# Patient Record
Sex: FEMALE | Race: OTHER | HISPANIC OR LATINO | URBAN - METROPOLITAN AREA
[De-identification: names, ages, dates, MRNs, and addresses within clinical notes are randomized per-mention and may not be internally consistent; named-entity substitution may affect disease eponyms.]

---

## 2017-01-28 ENCOUNTER — OUTPATIENT (OUTPATIENT)
Dept: OUTPATIENT SERVICES | Facility: HOSPITAL | Age: 47
LOS: 1 days | End: 2017-01-28
Payer: COMMERCIAL

## 2017-01-28 DIAGNOSIS — Z00.8 ENCOUNTER FOR OTHER GENERAL EXAMINATION: ICD-10-CM

## 2017-01-28 PROCEDURE — 77067 SCR MAMMO BI INCL CAD: CPT

## 2017-01-28 PROCEDURE — 77063 BREAST TOMOSYNTHESIS BI: CPT | Mod: 26

## 2017-01-28 PROCEDURE — G0202: CPT | Mod: 26

## 2017-01-28 PROCEDURE — 77063 BREAST TOMOSYNTHESIS BI: CPT

## 2017-10-03 PROBLEM — Z00.00 ENCOUNTER FOR PREVENTIVE HEALTH EXAMINATION: Status: ACTIVE | Noted: 2017-10-03

## 2017-11-01 ENCOUNTER — APPOINTMENT (OUTPATIENT)
Dept: DERMATOLOGY | Facility: CLINIC | Age: 47
End: 2017-11-01
Payer: MEDICAID

## 2017-11-01 PROCEDURE — 99201 OFFICE OUTPATIENT NEW 10 MINUTES: CPT

## 2018-01-02 ENCOUNTER — APPOINTMENT (OUTPATIENT)
Dept: DERMATOLOGY | Facility: CLINIC | Age: 48
End: 2018-01-02

## 2018-04-13 ENCOUNTER — APPOINTMENT (OUTPATIENT)
Dept: DERMATOLOGY | Facility: CLINIC | Age: 48
End: 2018-04-13
Payer: MEDICAID

## 2018-04-13 PROCEDURE — 99212 OFFICE O/P EST SF 10 MIN: CPT

## 2018-06-15 ENCOUNTER — APPOINTMENT (OUTPATIENT)
Dept: DERMATOLOGY | Facility: CLINIC | Age: 48
End: 2018-06-15
Payer: MEDICAID

## 2018-06-15 PROCEDURE — 99213 OFFICE O/P EST LOW 20 MIN: CPT

## 2018-08-21 ENCOUNTER — APPOINTMENT (OUTPATIENT)
Dept: DERMATOLOGY | Facility: CLINIC | Age: 48
End: 2018-08-21

## 2023-06-06 ENCOUNTER — APPOINTMENT (OUTPATIENT)
Dept: NEUROLOGY | Facility: CLINIC | Age: 53
End: 2023-06-06
Payer: MEDICAID

## 2023-06-06 VITALS
BODY MASS INDEX: 30.47 KG/M2 | WEIGHT: 171.96 LBS | DIASTOLIC BLOOD PRESSURE: 70 MMHG | SYSTOLIC BLOOD PRESSURE: 124 MMHG | HEIGHT: 63 IN

## 2023-06-06 DIAGNOSIS — Z86.59 PERSONAL HISTORY OF OTHER MENTAL AND BEHAVIORAL DISORDERS: ICD-10-CM

## 2023-06-06 DIAGNOSIS — G31.84 MILD COGNITIVE IMPAIRMENT, SO STATED: ICD-10-CM

## 2023-06-06 DIAGNOSIS — Z86.39 PERSONAL HISTORY OF OTHER ENDOCRINE, NUTRITIONAL AND METABOLIC DISEASE: ICD-10-CM

## 2023-06-06 DIAGNOSIS — R41.3 OTHER AMNESIA: ICD-10-CM

## 2023-06-06 DIAGNOSIS — Z78.9 OTHER SPECIFIED HEALTH STATUS: ICD-10-CM

## 2023-06-06 PROCEDURE — 99204 OFFICE O/P NEW MOD 45 MIN: CPT

## 2023-06-06 RX ORDER — VENLAFAXINE HYDROCHLORIDE 75 MG/1
75 CAPSULE, EXTENDED RELEASE ORAL
Refills: 0 | Status: ACTIVE | COMMUNITY

## 2023-06-06 RX ORDER — ROSUVASTATIN CALCIUM 10 MG/1
10 TABLET, FILM COATED ORAL
Refills: 0 | Status: ACTIVE | COMMUNITY

## 2023-06-06 RX ORDER — CHROMIUM 200 MCG
1000 TABLET ORAL
Refills: 0 | Status: ACTIVE | COMMUNITY

## 2023-06-06 RX ORDER — MAGNESIUM OXIDE/MAG AA CHELATE 300 MG
CAPSULE ORAL
Refills: 0 | Status: ACTIVE | COMMUNITY

## 2023-06-06 RX ORDER — OXYCODONE 5 MG/1
5 TABLET ORAL
Refills: 0 | Status: ACTIVE | COMMUNITY

## 2023-06-06 RX ORDER — LEVOCETIRIZINE DIHYDROCHLORIDE 5 MG/1
5 TABLET ORAL
Refills: 0 | Status: ACTIVE | COMMUNITY

## 2023-06-12 ENCOUNTER — APPOINTMENT (OUTPATIENT)
Dept: NEUROLOGY | Facility: CLINIC | Age: 53
End: 2023-06-12
Payer: MEDICAID

## 2023-06-12 PROCEDURE — 93040 RHYTHM ECG WITH REPORT: CPT

## 2023-06-12 PROCEDURE — 95819 EEG AWAKE AND ASLEEP: CPT

## 2023-07-11 NOTE — ASSESSMENT
[FreeTextEntry1] : Forgetfulness and episodes where her mind goes blank ever since COVID 3 years ago\par She may have long COVID syndrome\par Neurological examination including mental status testing is entirely normal.\par We will check a brain MRI and an EEG with further discussions to follow

## 2023-07-11 NOTE — CONSULT LETTER
[Dear  ___] : Dear  [unfilled], [Consult Letter:] : I had the pleasure of evaluating your patient, [unfilled]. [Please see my note below.] : Please see my note below. [Consult Closing:] : Thank you very much for allowing me to participate in the care of this patient.  If you have any questions, please do not hesitate to contact me. [Sincerely,] : Sincerely, [FreeTextEntry3] : Jackson Fuentes MD, PhD, DPN-N\par [facility]\par Chief, Division of Neurology\par [facility]\par

## 2023-07-11 NOTE — REASON FOR VISIT
[Consultation] : a consultation visit [[employer] ] : provided by [employer]   [TWNoteComboBox1] : [place]

## 2023-07-11 NOTE — PHYSICAL EXAM
[General Appearance - Alert] : alert [General Appearance - In No Acute Distress] : in no acute distress [General Appearance - Well-Appearing] : healthy appearing [Oriented To Time, Place, And Person] : oriented to person, place, and time [Impaired Insight] : insight and judgment were intact [Affect] : the affect was normal [Person] : oriented to person [Place] : oriented to place [Time] : oriented to time [Short Term Intact] : short term memory intact [Remote Intact] : remote memory intact [Registration Intact] : recent registration memory intact [Concentration Intact] : normal concentrating ability [Naming Objects] : no difficulty naming common objects [Repeating Phrases] : no difficulty repeating a phrase [Fluency] : fluency intact [Comprehension] : comprehension intact [Past History] : adequate knowledge of personal past history [Cranial Nerves Optic (II)] : visual acuity intact bilaterally,  visual fields full to confrontation, pupils equal round and reactive to light [Cranial Nerves Oculomotor (III)] : extraocular motion intact [Cranial Nerves Trigeminal (V)] : facial sensation intact symmetrically [Cranial Nerves Facial (VII)] : face symmetrical [Cranial Nerves Vestibulocochlear (VIII)] : hearing was intact bilaterally [Cranial Nerves Glossopharyngeal (IX)] : tongue and palate midline [Cranial Nerves Accessory (XI - Cranial And Spinal)] : head turning and shoulder shrug symmetric [Cranial Nerves Hypoglossal (XII)] : there was no tongue deviation with protrusion [Motor Tone] : muscle tone was normal in all four extremities [Motor Strength] : muscle strength was normal in all four extremities [No Muscle Atrophy] : normal bulk in all four extremities [Sensation Tactile Decrease] : light touch was intact [Sensation Pain / Temperature Decrease] : pain and temperature was intact [Abnormal Walk] : normal gait [Balance] : balance was intact [2+] : Ankle jerk left 2+ [PERRL With Normal Accommodation] : pupils were equal in size, round, reactive to light, with normal accommodation [Extraocular Movements] : extraocular movements were intact [Full Visual Field] : full visual field [Memory Recent] : recent memory was not impaired [Paresis Pronator Drift Right-Sided] : no pronator drift on the right [Paresis Pronator Drift Left-Sided] : no pronator drift on the left [Romberg's Sign] : Romberg's sign was negtive [Past-pointing] : there was no past-pointing [Tremor] : no tremor present [Coordination - Dysmetria Impaired Finger-to-Nose Bilateral] : not present [Plantar Reflex Right Only] : normal on the right [Coordination - Dysmetria Impaired Heel-to-Shin Bilateral] : not present [Plantar Reflex Left Only] : normal on the left [FreeTextEntry4] : Short-term recall 3/3

## 2023-07-11 NOTE — HISTORY OF PRESENT ILLNESS
[FreeTextEntry1] : She reports that she had COVID in 2020.  Says she was sick but never hospitalized.  Ever since then she finds herself forgetful.  At times her mind will go blank.  Says it happens frequently.  She was working as a  but is currently out of work due to shoulder problems.  She drives without a problem although she says she has to drive very slowly.  She has not done anything that would cause danger to herself or others\par \par Medical history otherwise significant for hyperlipidemia and depression.  She is undergoing treatment by orthopedics for a right shoulder problem.

## 2023-07-17 ENCOUNTER — NON-APPOINTMENT (OUTPATIENT)
Age: 53
End: 2023-07-17

## 2023-07-17 LAB
ALBUMIN SERPL ELPH-MCNC: 4.9 G/DL
ALP BLD-CCNC: 97 U/L
ALT SERPL-CCNC: 14 U/L
ANION GAP SERPL CALC-SCNC: 16 MMOL/L
AST SERPL-CCNC: 17 U/L
BILIRUB SERPL-MCNC: 0.4 MG/DL
BUN SERPL-MCNC: 13 MG/DL
CALCIUM SERPL-MCNC: 9.8 MG/DL
CHLORIDE SERPL-SCNC: 109 MMOL/L
CO2 SERPL-SCNC: 18 MMOL/L
CREAT SERPL-MCNC: 0.66 MG/DL
EGFR: 107 ML/MIN/1.73M2
FOLATE SERPL-MCNC: 14.4 NG/ML
GLUCOSE SERPL-MCNC: 83 MG/DL
HCT VFR BLD CALC: 44.2 %
HGB BLD-MCNC: 13.9 G/DL
MCHC RBC-ENTMCNC: 29.1 PG
MCHC RBC-ENTMCNC: 31.4 GM/DL
MCV RBC AUTO: 92.5 FL
PLATELET # BLD AUTO: 267 K/UL
POTASSIUM SERPL-SCNC: 4.7 MMOL/L
PROT SERPL-MCNC: 7.5 G/DL
RBC # BLD: 4.78 M/UL
RBC # FLD: 12.6 %
SODIUM SERPL-SCNC: 143 MMOL/L
TSH SERPL-ACNC: 1.57 UIU/ML
VIT B12 SERPL-MCNC: 743 PG/ML
WBC # FLD AUTO: 5.92 K/UL

## 2023-07-20 LAB — METHYLMALONATE SERPL-SCNC: 183 NMOL/L

## 2023-08-04 LAB — T4 FREE SERPL DIALY-MCNC: 0.8 NG/DL

## 2023-10-10 ENCOUNTER — OUTPATIENT (OUTPATIENT)
Dept: OUTPATIENT SERVICES | Facility: HOSPITAL | Age: 53
LOS: 1 days | End: 2023-10-10
Payer: MEDICAID

## 2023-10-10 ENCOUNTER — APPOINTMENT (OUTPATIENT)
Dept: MRI IMAGING | Facility: CLINIC | Age: 53
End: 2023-10-10
Payer: MEDICAID

## 2023-10-10 DIAGNOSIS — G31.84 MILD COGNITIVE IMPAIRMENT OF UNCERTAIN OR UNKNOWN ETIOLOGY: ICD-10-CM

## 2023-10-10 PROCEDURE — 70553 MRI BRAIN STEM W/O & W/DYE: CPT | Mod: 26

## 2023-10-10 PROCEDURE — A9585: CPT

## 2023-10-10 PROCEDURE — 70553 MRI BRAIN STEM W/O & W/DYE: CPT

## 2023-10-11 ENCOUNTER — NON-APPOINTMENT (OUTPATIENT)
Age: 53
End: 2023-10-11

## 2023-10-21 ENCOUNTER — OFFICE (OUTPATIENT)
Dept: URBAN - METROPOLITAN AREA CLINIC 112 | Facility: CLINIC | Age: 53
Setting detail: OPHTHALMOLOGY
End: 2023-10-21
Payer: COMMERCIAL

## 2023-10-21 DIAGNOSIS — H16.223: ICD-10-CM

## 2023-10-21 DIAGNOSIS — H52.4: ICD-10-CM

## 2023-10-21 DIAGNOSIS — H43.391: ICD-10-CM

## 2023-10-21 DIAGNOSIS — H16.222: ICD-10-CM

## 2023-10-21 PROBLEM — H16.221 DRY EYE SYNDROME K SICCA; RIGHT EYE, LEFT EYE, BOTH EYES: Status: ACTIVE | Noted: 2023-10-21

## 2023-10-21 PROCEDURE — 92014 COMPRE OPH EXAM EST PT 1/>: CPT | Performed by: OPHTHALMOLOGY

## 2023-10-21 PROCEDURE — 83861 MICROFLUID ANALY TEARS: CPT | Performed by: OPHTHALMOLOGY

## 2023-10-21 PROCEDURE — 92250 FUNDUS PHOTOGRAPHY W/I&R: CPT | Performed by: OPHTHALMOLOGY

## 2023-10-21 PROCEDURE — 83861 MICROFLUID ANALY TEARS: CPT | Mod: QW,LT | Performed by: OPHTHALMOLOGY

## 2023-10-21 PROCEDURE — 92015 DETERMINE REFRACTIVE STATE: CPT | Performed by: OPHTHALMOLOGY

## 2023-10-21 ASSESSMENT — REFRACTION_MANIFEST
OD_VA1: 20/20
OU_VA: 20/20
OD_AXIS: 000
OS_VA1: 20/20
OS_CYLINDER: -0.50
OD_VA1: 20/20
OS_ADD: +2.00
OD_VA2: 20/20
OD_CYLINDER: PLANO
OS_SPHERE: SPHERE
OS_ADD: +1.75
OS_AXIS: 174
OD_SPHERE: +0.50
OS_VA2: 20/20
OD_ADD: +1.75
OS_CYLINDER: -0.50
OD_ADD: +2.00
OS_VA1: 20/20
OS_SPHERE: PLANO
OS_AXIS: 170
OD_SPHERE: PLANO

## 2023-10-21 ASSESSMENT — AXIALLENGTH_DERIVED
OD_AL: 23.8152
OS_AL: 24.156

## 2023-10-21 ASSESSMENT — REFRACTION_AUTOREFRACTION
OS_AXIS: 170
OD_SPHERE: +0.50
OS_SPHERE: +0.25
OS_CYLINDER: -0.50
OD_AXIS: 081
OD_CYLINDER: -0.25

## 2023-10-21 ASSESSMENT — KERATOMETRY
OD_AXISANGLE_DEGREES: 085
OS_K1POWER_DIOPTERS: 41.50
OS_K2POWER_DIOPTERS: 42.50
OS_AXISANGLE_DEGREES: 074
OD_K2POWER_DIOPTERS: 42.75
OD_K1POWER_DIOPTERS: 42.25

## 2023-10-21 ASSESSMENT — SUPERFICIAL PUNCTATE KERATITIS (SPK)
OD_SPK: T
OS_SPK: 1+ 2+

## 2023-10-21 ASSESSMENT — SPHEQUIV_DERIVED
OS_SPHEQUIV: 0
OD_SPHEQUIV: 0.375

## 2023-10-21 ASSESSMENT — CONFRONTATIONAL VISUAL FIELD TEST (CVF)
OS_FINDINGS: FULL
OD_FINDINGS: FULL

## 2023-10-21 ASSESSMENT — TONOMETRY
OS_IOP_MMHG: 17
OD_IOP_MMHG: 16

## 2023-10-21 ASSESSMENT — VISUAL ACUITY
OS_BCVA: 20/40
OD_BCVA: 20/30

## 2024-03-30 ENCOUNTER — RX ONLY (RX ONLY)
Age: 54
End: 2024-03-30

## 2024-03-30 ENCOUNTER — OFFICE (OUTPATIENT)
Dept: URBAN - METROPOLITAN AREA CLINIC 6 | Facility: CLINIC | Age: 54
Setting detail: OPHTHALMOLOGY
End: 2024-03-30
Payer: COMMERCIAL

## 2024-03-30 DIAGNOSIS — H11.153: ICD-10-CM

## 2024-03-30 DIAGNOSIS — H40.013: ICD-10-CM

## 2024-03-30 DIAGNOSIS — H43.393: ICD-10-CM

## 2024-03-30 DIAGNOSIS — H52.7: ICD-10-CM

## 2024-03-30 DIAGNOSIS — H16.223: ICD-10-CM

## 2024-03-30 DIAGNOSIS — H25.13: ICD-10-CM

## 2024-03-30 PROCEDURE — 92015 DETERMINE REFRACTIVE STATE: CPT | Performed by: OPHTHALMOLOGY

## 2024-03-30 PROCEDURE — 92014 COMPRE OPH EXAM EST PT 1/>: CPT | Performed by: OPHTHALMOLOGY

## 2024-03-30 PROCEDURE — 76514 ECHO EXAM OF EYE THICKNESS: CPT | Performed by: OPHTHALMOLOGY

## 2024-03-30 PROCEDURE — 92083 EXTENDED VISUAL FIELD XM: CPT | Performed by: OPHTHALMOLOGY

## 2024-03-30 PROCEDURE — 92250 FUNDUS PHOTOGRAPHY W/I&R: CPT | Performed by: OPHTHALMOLOGY

## 2024-03-30 ASSESSMENT — REFRACTION_MANIFEST
OD_VA1: 20/20
OD_ADD: +2.00
OS_ADD: +2.00
OS_VA1: 20/20
OD_AXIS: 000
OU_VA: 20/20
OD_VA2: 20/20
OD_VA1: 20/20
OS_ADD: +2.00
OU_VA: 20/20
OD_SPHERE: +0.50
OS_CYLINDER: -0.75
OS_SPHERE: +0.50
OS_CYLINDER: -0.50
OD_CYLINDER: PLANO
OS_VA1: 20/20
OS_AXIS: 174
OD_SPHERE: +0.25
OD_CYLINDER: SPHERE
OD_ADD: +2.00
OS_AXIS: 175
OS_VA2: 20/20
OS_SPHERE: SPHERE

## 2024-03-30 ASSESSMENT — SPHEQUIV_DERIVED: OS_SPHEQUIV: 0.125

## 2025-02-03 ENCOUNTER — OFFICE (OUTPATIENT)
Dept: URBAN - METROPOLITAN AREA CLINIC 6 | Facility: CLINIC | Age: 55
Setting detail: OPHTHALMOLOGY
End: 2025-02-03
Payer: COMMERCIAL

## 2025-02-03 DIAGNOSIS — H43.393: ICD-10-CM

## 2025-02-03 DIAGNOSIS — H52.4: ICD-10-CM

## 2025-02-03 DIAGNOSIS — H40.013: ICD-10-CM

## 2025-02-03 DIAGNOSIS — H16.223: ICD-10-CM

## 2025-02-03 PROCEDURE — 92014 COMPRE OPH EXAM EST PT 1/>: CPT

## 2025-02-03 PROCEDURE — 92133 CPTRZD OPH DX IMG PST SGM ON: CPT

## 2025-02-03 PROCEDURE — 92015 DETERMINE REFRACTIVE STATE: CPT

## 2025-02-03 ASSESSMENT — KERATOMETRY
OD_K1POWER_DIOPTERS: 42.25
OS_K1POWER_DIOPTERS: 41.50
OS_K2POWER_DIOPTERS: 42.50
OS_AXISANGLE_DEGREES: 075
OD_K2POWER_DIOPTERS: 43.00
OD_AXISANGLE_DEGREES: 079

## 2025-02-03 ASSESSMENT — VISUAL ACUITY
OS_BCVA: 20/30+2
OD_BCVA: 20/50-2

## 2025-02-03 ASSESSMENT — REFRACTION_MANIFEST
OD_VA1: 20/20
OD_AXIS: 000
OS_CYLINDER: -0.50
OS_VA1: 20/20
OS_CYLINDER: -0.75
OU_VA: 20/20
OD_VA1: 20/20
OD_SPHERE: +0.50
OS_VA1: 20/20
OD_ADD: +2.00
OS_AXIS: 180
OD_CYLINDER: PLANO
OD_CYLINDER: SPHERE
OS_AXIS: 174
OS_SPHERE: +0.25
OS_SPHERE: SPHERE
OS_VA2: 20/20
OU_VA: 20/20
OS_ADD: +2.00
OD_VA2: 20/20
OD_ADD: +2.00
OS_ADD: +2.00
OD_SPHERE: +0.50

## 2025-02-03 ASSESSMENT — PACHYMETRY
OD_CT_UM: 495
OS_CT_UM: 489
OD_CT_CORRECTION: 4
OS_CT_CORRECTION: 4

## 2025-02-03 ASSESSMENT — REFRACTION_AUTOREFRACTION
OD_CYLINDER: -0.25
OS_SPHERE: +0.25
OD_AXIS: 086
OD_SPHERE: +0.50
OS_CYLINDER: -0.75
OS_AXIS: 178

## 2025-02-03 ASSESSMENT — SUPERFICIAL PUNCTATE KERATITIS (SPK)
OD_SPK: T
OS_SPK: T

## 2025-02-03 ASSESSMENT — CONFRONTATIONAL VISUAL FIELD TEST (CVF)
OD_FINDINGS: FULL
OS_FINDINGS: FULL

## 2025-02-03 ASSESSMENT — TONOMETRY
OD_IOP_MMHG: 15
OS_IOP_MMHG: 15

## 2025-03-31 ENCOUNTER — OFFICE (OUTPATIENT)
Dept: URBAN - METROPOLITAN AREA CLINIC 116 | Facility: CLINIC | Age: 55
Setting detail: OPHTHALMOLOGY
End: 2025-03-31
Payer: COMMERCIAL

## 2025-03-31 DIAGNOSIS — H11.153: ICD-10-CM

## 2025-03-31 DIAGNOSIS — H52.4: ICD-10-CM

## 2025-03-31 PROCEDURE — 99212 OFFICE O/P EST SF 10 MIN: CPT | Performed by: OPTOMETRIST

## 2025-03-31 PROCEDURE — CLNEW CL LENS FIT FIRST TIME WEARER FITTING FEE ONLY: Performed by: OPTOMETRIST

## 2025-03-31 ASSESSMENT — REFRACTION_MANIFEST
OS_SPHERE: SPHERE
OD_SPHERE: +0.50
OU_VA: 20/20
OD_VA1: 20/20
OS_AXIS: 174
OS_VA2: 20/20
OD_SPHERE: +0.75
OS_SPHERE: +0.25
OD_AXIS: 000
OD_VA1: 20/20
OS_SPHERE: +0.50
OS_ADD: +2.00
OD_ADD: +2.00
OS_ADD: +2.00
OS_AXIS: 175
OS_ADD: +1.50
OD_CYLINDER: SPH
OU_VA: 20/20
OD_CYLINDER: SPHERE
OD_VA2: 20/20
OS_VA1: 20/20
OS_VA1: 20/20
OD_VA1: 20/20
OS_CYLINDER: -0.50
OD_CYLINDER: PLANO
OS_CYLINDER: -0.50
OD_ADD: +1.50
OD_SPHERE: +0.50
OS_VA1: 20/20
OS_AXIS: 180
OS_CYLINDER: -0.75
OD_ADD: +2.00

## 2025-03-31 ASSESSMENT — PACHYMETRY
OD_CT_UM: 495
OS_CT_CORRECTION: 4
OS_CT_UM: 489
OD_CT_CORRECTION: 4

## 2025-03-31 ASSESSMENT — TONOMETRY
OS_IOP_MMHG: 16
OD_IOP_MMHG: 16

## 2025-03-31 ASSESSMENT — CONFRONTATIONAL VISUAL FIELD TEST (CVF)
OS_FINDINGS: FULL
OD_FINDINGS: FULL

## 2025-03-31 ASSESSMENT — VISUAL ACUITY
OD_BCVA: 20/50-2
OS_BCVA: 20/30+2

## 2025-04-21 ENCOUNTER — OFFICE (OUTPATIENT)
Dept: URBAN - METROPOLITAN AREA CLINIC 116 | Facility: CLINIC | Age: 55
Setting detail: OPHTHALMOLOGY
End: 2025-04-21

## 2025-04-21 DIAGNOSIS — H52.4: ICD-10-CM

## 2025-04-21 PROCEDURE — N/C NO CHARGE: Performed by: OPTOMETRIST

## 2025-04-21 ASSESSMENT — REFRACTION_MANIFEST
OS_AXIS: 174
OD_ADD: +2.00
OD_VA1: 20/20
OD_VA2: 20/20
OD_VA1: 20/20
OS_ADD: +2.00
OD_SPHERE: +0.75
OD_ADD: +2.00
OD_SPHERE: +0.50
OS_SPHERE: +0.50
OD_CYLINDER: SPH
OS_CYLINDER: -0.75
OD_CYLINDER: SPHERE
OD_AXIS: 000
OU_VA: 20/20
OD_VA1: 20/20
OS_ADD: +1.50
OD_ADD: +1.50
OS_AXIS: 175
OS_CYLINDER: -0.50
OS_SPHERE: SPHERE
OS_AXIS: 180
OS_CYLINDER: -0.50
OS_VA1: 20/20
OD_SPHERE: +0.50
OS_VA1: 20/20
OD_CYLINDER: PLANO
OS_VA2: 20/20
OS_VA1: 20/20
OS_SPHERE: +0.25
OS_ADD: +2.00
OU_VA: 20/20

## 2025-04-21 ASSESSMENT — KERATOMETRY
OS_K1POWER_DIOPTERS: 41.50
OS_K2POWER_DIOPTERS: 42.25
OD_AXISANGLE_DEGREES: 0720
OD_K1POWER_DIOPTERS: 42.00
OS_AXISANGLE_DEGREES: 062
OD_K2POWER_DIOPTERS: 42.75

## 2025-04-21 ASSESSMENT — REFRACTION_AUTOREFRACTION
OS_SPHERE: +1.00
OD_SPHERE: +0.75
OD_AXIS: 148
OS_AXIS: 172
OD_CYLINDER: -0.25
OS_CYLINDER: -0.75

## 2025-04-21 ASSESSMENT — SUPERFICIAL PUNCTATE KERATITIS (SPK)
OS_SPK: T
OD_SPK: T

## 2025-04-21 ASSESSMENT — VISUAL ACUITY
OD_BCVA: 20/50-2
OS_BCVA: 20/30+2

## 2025-04-28 ENCOUNTER — OFFICE (OUTPATIENT)
Dept: URBAN - METROPOLITAN AREA CLINIC 116 | Facility: CLINIC | Age: 55
Setting detail: OPHTHALMOLOGY
End: 2025-04-28
Payer: COMMERCIAL

## 2025-04-28 DIAGNOSIS — H52.4: ICD-10-CM

## 2025-04-28 PROCEDURE — N/C NO CHARGE: Performed by: OPTOMETRIST

## 2025-04-28 ASSESSMENT — PACHYMETRY
OS_CT_UM: 489
OD_CT_CORRECTION: 4
OD_CT_UM: 495
OS_CT_CORRECTION: 4

## 2025-04-28 ASSESSMENT — KERATOMETRY
OD_K2POWER_DIOPTERS: 42.75
OS_AXISANGLE_DEGREES: 077
OD_K1POWER_DIOPTERS: 42.00
OS_K1POWER_DIOPTERS: 42.50
OS_K2POWER_DIOPTERS: 43.50
OD_AXISANGLE_DEGREES: 081

## 2025-04-28 ASSESSMENT — REFRACTION_MANIFEST
OS_VA1: 20/20
OS_SPHERE: +0.50
OD_AXIS: 000
OS_AXIS: 180
OD_ADD: +2.00
OD_ADD: +2.00
OU_VA: 20/20
OS_VA1: 20/20
OS_SPHERE: +0.25
OS_AXIS: 175
OS_CYLINDER: -0.75
OS_ADD: +2.00
OS_VA2: 20/20
OD_ADD: +1.50
OD_CYLINDER: SPHERE
OD_SPHERE: +0.50
OU_VA: 20/20
OD_SPHERE: +0.50
OD_CYLINDER: PLANO
OD_VA1: 20/20
OD_VA2: 20/20
OS_SPHERE: SPHERE
OS_CYLINDER: -0.50
OS_CYLINDER: -0.50
OD_VA1: 20/20
OD_CYLINDER: SPH
OD_VA1: 20/20
OS_AXIS: 174
OD_SPHERE: +0.75
OS_ADD: +1.50
OS_VA1: 20/20
OS_ADD: +2.00

## 2025-04-28 ASSESSMENT — REFRACTION_AUTOREFRACTION
OD_AXIS: 167
OD_CYLINDER: -0.25
OS_SPHERE: -0.75
OS_AXIS: 001
OD_SPHERE: +0.75
OS_CYLINDER: -1.00

## 2025-04-28 ASSESSMENT — CONFRONTATIONAL VISUAL FIELD TEST (CVF)
OD_FINDINGS: FULL
OS_FINDINGS: FULL

## 2025-04-28 ASSESSMENT — TONOMETRY: OD_IOP_MMHG: 16

## 2025-04-28 ASSESSMENT — SUPERFICIAL PUNCTATE KERATITIS (SPK)
OS_SPK: T
OD_SPK: T

## 2025-04-28 ASSESSMENT — VISUAL ACUITY
OS_BCVA: 20/30+2
OD_BCVA: 20/50-2

## 2025-08-04 ENCOUNTER — APPOINTMENT (OUTPATIENT)
Dept: NEUROLOGY | Facility: CLINIC | Age: 55
End: 2025-08-04
Payer: MEDICAID

## 2025-08-04 VITALS
HEART RATE: 67 BPM | BODY MASS INDEX: 29.23 KG/M2 | HEIGHT: 63 IN | WEIGHT: 165 LBS | SYSTOLIC BLOOD PRESSURE: 125 MMHG | DIASTOLIC BLOOD PRESSURE: 79 MMHG

## 2025-08-04 DIAGNOSIS — G31.84 MILD COGNITIVE IMPAIRMENT, SO STATED: ICD-10-CM

## 2025-08-04 DIAGNOSIS — R41.3 OTHER AMNESIA: ICD-10-CM

## 2025-08-04 DIAGNOSIS — Z78.9 OTHER SPECIFIED HEALTH STATUS: ICD-10-CM

## 2025-08-04 PROCEDURE — G2211 COMPLEX E/M VISIT ADD ON: CPT | Mod: NC

## 2025-08-04 PROCEDURE — 99215 OFFICE O/P EST HI 40 MIN: CPT

## 2025-08-04 RX ORDER — PAROXETINE HYDROCHLORIDE 40 MG/1
TABLET, FILM COATED ORAL
Refills: 0 | Status: ACTIVE | COMMUNITY

## 2025-08-04 RX ORDER — DULOXETINE 20 MG/1
20 CAPSULE, DELAYED RELEASE ORAL
Refills: 0 | Status: ACTIVE | COMMUNITY

## 2025-08-04 RX ORDER — OXYCODONE HYDROCHLORIDE 30 MG/1
TABLET ORAL
Refills: 0 | Status: ACTIVE | COMMUNITY

## 2025-08-04 RX ORDER — TIZANIDINE 4 MG/1
TABLET ORAL
Refills: 0 | Status: ACTIVE | COMMUNITY

## 2025-08-04 RX ORDER — PYRIDOXINE HCL (VITAMIN B6) 50 MG
TABLET ORAL
Refills: 0 | Status: ACTIVE | COMMUNITY